# Patient Record
(demographics unavailable — no encounter records)

---

## 2025-05-12 NOTE — ADDENDUM
[FreeTextEntry1] :  Documented by Gerard Walter acting as a scribe for Dr. MADRIGAL [facility] on 05/12/2025.

## 2025-05-12 NOTE — DATA REVIEWED
[FreeTextEntry1] : 05- - Right Dx Mammo and US - BIRADS2 -The breasts are heterogeneously dense, which may obscure small masses. - A nodular opacity in the inferior aspect of the right breast is partially compressible on spot compression views.  -Several small cysts in the 0.3 to 0.4 cm size range are seen in the 7N 5 Follow-Up: Annual screening

## 2025-05-12 NOTE — END OF VISIT
[FreeTextEntry3] : All medical record entries made by the tyree were at my, SABA Hennessy, direction and personally dictated by me on 05/12/2025. I have reviewed the chart and agreed that the record accurately reflects my personal performance of the history, physical examination, assessment and plan. I have also personally directed, reviewed and agreed with the chart.

## 2025-05-12 NOTE — HISTORY OF PRESENT ILLNESS
[FreeTextEntry1] : José Manuel is a 43 y/o F who is here for Right breast pain.  Pt endorses she had right breast pain. Denies feeling any palpable mass. Pt had a recent breast infection. Pt was given abx.   FHx, paternal aunt has breast ca SHx, non-smoker, no EtOH  Her imaging is as follows: 05- - Right Dx Mammo and US - BIRADS2 -The breasts are heterogeneously dense, which may obscure small masses. - A nodular opacity in the inferior aspect of the right breast is partially compressible on spot compression views.  -Several small cysts in the 0.3 to 0.4 cm size range are seen in the 7N 5 Follow-Up: Annual screening

## 2025-05-12 NOTE — PAST MEDICAL HISTORY
[Menarche Age ____] : age at menarche was [unfilled] [Total Preg ___] : G[unfilled] [Live Births ___] : P[unfilled]  [Age At Live Birth ___] : Age at live birth: [unfilled] [FreeTextEntry7] : Less than 5 years  [FreeTextEntry8] : Breast fed her last baby